# Patient Record
(demographics unavailable — no encounter records)

---

## 2025-04-09 NOTE — REASON FOR VISIT
[Initial Consultation] : an initial consultation for [Eczema] : eczema [Hives] : hives [Mother] : mother

## 2025-04-09 NOTE — IMPRESSION
[_____] : grasses ([unfilled]) [Allergy Testing Dust Mite] : dust mites [Allergy Testing Mixed Feathers] : feathers [Allergy Testing Cockroach] : cockroach [Allergy Testing Dog] : dog [Allergy Testing Cat] : cat [Allergy Testing Trees] : trees [Allergy Testing Weeds] : weeds [________] : [unfilled]

## 2025-04-15 NOTE — SOCIAL HISTORY
Please process referral for MRI, [facility] [Dry] : dry [None] : none [Bedroom] : not in the bedroom [Basement] : not in the basement [Living Area] : not in the living area [Smokers in Household] : there are no smokers in the home

## 2025-04-15 NOTE — SOCIAL HISTORY
[Dry] : dry [None] : none [Bedroom] : not in the bedroom [Basement] : not in the basement [Living Area] : not in the living area [Smokers in Household] : there are no smokers in the home

## 2025-04-15 NOTE — PHYSICAL EXAM
[Alert] : alert [Well Nourished] : well nourished [Healthy Appearance] : healthy appearance [No Acute Distress] : no acute distress [Well Developed] : well developed [Normal Pupil & Iris Size/Symmetry] : normal pupil and iris size and symmetry [No Discharge] : no discharge [No Photophobia] : no photophobia [Sclera Not Icteric] : sclera not icteric [Normal TMs] : both tympanic membranes were normal [Normal Nasal Mucosa] : the nasal mucosa was normal [Normal Lips/Tongue] : the lips and tongue were normal [Normal Outer Ear/Nose] : the ears and nose were normal in appearance [Normal Tonsils] : normal tonsils [No Thrush] : no thrush [Pale mucosa] : pale mucosa [Supple] : the neck was supple [Normal Rate and Effort] : normal respiratory rhythm and effort [No Crackles] : no crackles [No Retractions] : no retractions [Bilateral Audible Breath Sounds] : bilateral audible breath sounds [Normal Rate] : heart rate was normal  [Normal S1, S2] : normal S1 and S2 [No murmur] : no murmur [Regular Rhythm] : with a regular rhythm [Soft] : abdomen soft [Not Tender] : non-tender [Normal Bowel Sounds] : normal bowel sounds [Not Distended] : not distended [Normal Cervical Lymph Nodes] : cervical [Skin Intact] : skin intact  [No Rash] : no rash [No Skin Lesions] : no skin lesions [No clubbing] : no clubbing [No Edema] : no edema [No Cyanosis] : no cyanosis [Normal Mood] : mood was normal [Normal Affect] : affect was normal [Alert, Awake, Oriented as Age-Appropriate] : alert, awake, oriented as age appropriate [Patches] : no patches [Urticaria] : no urticaria [de-identified] : dry skin

## 2025-04-15 NOTE — PHYSICAL EXAM
[Alert] : alert [Well Nourished] : well nourished [Healthy Appearance] : healthy appearance [No Acute Distress] : no acute distress [Well Developed] : well developed [Normal Pupil & Iris Size/Symmetry] : normal pupil and iris size and symmetry [No Discharge] : no discharge [No Photophobia] : no photophobia [Sclera Not Icteric] : sclera not icteric [Normal TMs] : both tympanic membranes were normal [Normal Nasal Mucosa] : the nasal mucosa was normal [Normal Lips/Tongue] : the lips and tongue were normal [Normal Outer Ear/Nose] : the ears and nose were normal in appearance [Normal Tonsils] : normal tonsils [No Thrush] : no thrush [Pale mucosa] : pale mucosa [Supple] : the neck was supple [Normal Rate and Effort] : normal respiratory rhythm and effort [No Crackles] : no crackles [No Retractions] : no retractions [Bilateral Audible Breath Sounds] : bilateral audible breath sounds [Normal Rate] : heart rate was normal  [Normal S1, S2] : normal S1 and S2 [No murmur] : no murmur [Regular Rhythm] : with a regular rhythm [Soft] : abdomen soft [Not Tender] : non-tender [Normal Bowel Sounds] : normal bowel sounds [Not Distended] : not distended [Normal Cervical Lymph Nodes] : cervical [Skin Intact] : skin intact  [No Rash] : no rash [No Skin Lesions] : no skin lesions [No clubbing] : no clubbing [No Edema] : no edema [No Cyanosis] : no cyanosis [Normal Mood] : mood was normal [Normal Affect] : affect was normal [Alert, Awake, Oriented as Age-Appropriate] : alert, awake, oriented as age appropriate [Patches] : no patches [Urticaria] : no urticaria [de-identified] : dry skin

## 2025-04-15 NOTE — HISTORY OF PRESENT ILLNESS
[Venom Reactions] : venom reactions [de-identified] : 4yoM presenting with eczema for allergy evaluation.   HPI: Bug bite reactions: gets large bumps with bug bites, mother says they swell up to diameter of small disk ~8-10cm Had an allergic reaction with hives all over whole body in 2023 of unclear etiology, no known food or environmental triggers Benadryl resolved the itching over the next few days has had no further hives breakouts since then   Eczema: Eucerin once a day after bathing occasionally, always in the morning Hydrocortisone PRN; his Dermatologist prescribed Eucrisa PRN, uses it more than the hydrocortisone Thinks it helps with symptoms [facility], mother denies using any dryer sheets while drying the laundry    Fhx: sister w/eczema, tested negative for aeroallergens mom no hx of allergies

## 2025-04-15 NOTE — HISTORY OF PRESENT ILLNESS
[Venom Reactions] : venom reactions [de-identified] : 4yoM presenting with eczema for allergy evaluation.   HPI: Bug bite reactions: gets large bumps with bug bites, mother says they swell up to diameter of small disk ~8-10cm Had an allergic reaction with hives all over whole body in 2023 of unclear etiology, no known food or environmental triggers Benadryl resolved the itching over the next few days has had no further hives breakouts since then   Eczema: Eucerin once a day after bathing occasionally, always in the morning Hydrocortisone PRN; his Dermatologist prescribed Eucrisa PRN, uses it more than the hydrocortisone Thinks it helps with symptoms [facility], mother denies using any dryer sheets while drying the laundry    Fhx: sister w/eczema, tested negative for aeroallergens mom no hx of allergies